# Patient Record
Sex: FEMALE | Race: WHITE | NOT HISPANIC OR LATINO | URBAN - METROPOLITAN AREA
[De-identification: names, ages, dates, MRNs, and addresses within clinical notes are randomized per-mention and may not be internally consistent; named-entity substitution may affect disease eponyms.]

---

## 2020-01-13 ENCOUNTER — OFFICE VISIT (OUTPATIENT)
Dept: URGENT CARE | Facility: CLINIC | Age: 30
End: 2020-01-13

## 2020-01-13 ENCOUNTER — APPOINTMENT (OUTPATIENT)
Dept: RADIOLOGY | Facility: IMAGING CENTER | Age: 30
End: 2020-01-13
Attending: PHYSICIAN ASSISTANT

## 2020-01-13 VITALS
HEIGHT: 68 IN | SYSTOLIC BLOOD PRESSURE: 100 MMHG | RESPIRATION RATE: 16 BRPM | WEIGHT: 113 LBS | DIASTOLIC BLOOD PRESSURE: 60 MMHG | HEART RATE: 103 BPM | BODY MASS INDEX: 17.13 KG/M2 | OXYGEN SATURATION: 98 % | TEMPERATURE: 99 F

## 2020-01-13 DIAGNOSIS — S67.01XA CRUSH INJURY TO THUMB, RIGHT, INITIAL ENCOUNTER: ICD-10-CM

## 2020-01-13 DIAGNOSIS — S67.01XA CRUSH INJURY TO THUMB, RIGHT, INITIAL ENCOUNTER: Primary | ICD-10-CM

## 2020-01-13 PROCEDURE — 73140 X-RAY EXAM OF FINGER(S): CPT | Mod: TC,RT | Performed by: PHYSICIAN ASSISTANT

## 2020-01-13 PROCEDURE — 99203 OFFICE O/P NEW LOW 30 MIN: CPT | Performed by: PHYSICIAN ASSISTANT

## 2020-01-13 NOTE — LETTER
January 13, 2020       Patient: Siobhan Chauhan   YOB: 1990   Date of Visit: 1/13/2020         To Whom It May Concern:    It is my medical opinion that Siobhan Chauhan is cleared for full duty at work after examination in clinic today. Pt will use caution with her duties not to worsen or aggravate right thumb symptoms.     If you have any questions or concerns, please don't hesitate to call [phone]          Sincerely,          Elpidio Berry P.A.-C.  Electronically Signed

## 2020-01-14 NOTE — PROGRESS NOTES
Subjective:      Pt is a 29 y.o. female who presents with Injury (thumb finger on right hand)            HPI  This is a new problem. Pt notes slammed right thumb in car door last night with crush injury and bruising and pain x 1 day. Pt notes she needs it evaluated and cleared for her work at [employer]. Pt has not taken any Rx medications for this condition. Pt states the pain is a 3/10, aching in nature and worse at night. Pt denies CP, SOB, NVD, paresthesias, headaches, dizziness, change in vision, hives, or other joint pain. The pt's medication list, problem list, and allergies have been evaluated and reviewed during today's visit.    PMH:  Negative per pt.      PSH:  Negative per pt.      Fam Hx:  the patient's family history is not pertinent to their current complaint      Soc HX:  Social History     Socioeconomic History   • Marital status: Single     Spouse name: Not on file   • Number of children: Not on file   • Years of education: Not on file   • Highest education level: Not on file   Occupational History   • Not on file   Social Needs   • Financial resource strain: Not on file   • Food insecurity:     Worry: Not on file     Inability: Not on file   • Transportation needs:     Medical: Not on file     Non-medical: Not on file   Tobacco Use   • Smoking status: Never Smoker   Substance and Sexual Activity   • Alcohol use: Yes   • Drug use: No   • Sexual activity: Not on file   Lifestyle   • Physical activity:     Days per week: Not on file     Minutes per session: Not on file   • Stress: Not on file   Relationships   • Social connections:     Talks on phone: Not on file     Gets together: Not on file     Attends Holiness service: Not on file     Active member of club or organization: Not on file     Attends meetings of clubs or organizations: Not on file     Relationship status: Not on file   • Intimate partner violence:     Fear of current or ex partner: Not on file     Emotionally abused: Not on file    "Physically abused: Not on file     Forced sexual activity: Not on file   Other Topics Concern   • Not on file   Social History Narrative   • Not on file         Medications:  No current outpatient medications on file.      Allergies:  Morphine    ROS    Constitutional: Negative for fever, chills and malaise/fatigue.   HENT: Negative for congestion and sore throat.    Eyes: Negative for blurred vision, double vision and photophobia.   Respiratory: Negative for cough and shortness of breath.  Cardiovascular: Negative for chest pain and palpitations.   Gastrointestinal: Negative for heartburn, nausea, vomiting, abdominal pain, diarrhea and constipation.   Genitourinary: Negative for dysuria and flank pain.   Musculoskeletal: POS for right thumb crush injury and myalgias.   Skin: Negative for itching and rash.   Neurological: Negative for dizziness, tingling and headaches.   Endo/Heme/Allergies: Does not bruise/bleed easily.   Psychiatric/Behavioral: Negative for depression. The patient is not nervous/anxious.         Objective:     /60 (BP Location: Left arm, Patient Position: Sitting, BP Cuff Size: Adult)   Pulse (!) 103   Temp 37.2 °C (99 °F) (Temporal)   Resp 16   Ht 1.727 m (5' 8\")   Wt 51.3 kg (113 lb)   LMP 12/15/2019   SpO2 98%   Breastfeeding? No   BMI 17.18 kg/m²      Physical Exam  Musculoskeletal:      Right hand: She exhibits decreased range of motion, tenderness, bony tenderness, disruption of two-point discrimination and swelling. She exhibits normal capillary refill, no deformity and no laceration. Normal sensation noted. Normal strength noted.        Hands:            Constitutional: PT is oriented to person, place, and time. PT appears well-developed and well-nourished. No distress.   HENT:   Head: Normocephalic and atraumatic.   Mouth/Throat: Oropharynx is clear and moist. No oropharyngeal exudate.   Eyes: Conjunctivae normal and EOM are normal. Pupils are equal, round, and reactive to " light.   Neck: Normal range of motion. Neck supple. No thyromegaly present.   Cardiovascular: Normal rate, regular rhythm, normal heart sounds and intact distal pulses.  Exam reveals no gallop and no friction rub.    No murmur heard.  Pulmonary/Chest: Effort normal and breath sounds normal. No respiratory distress. PT has no wheezes. PT has no rales. Pt exhibits no tenderness.   Abdominal: Soft. Bowel sounds are normal. PT exhibits no distension and no mass. There is no tenderness. There is no rebound and no guarding.   Neurological: PT is alert and oriented to person, place, and time. PT has normal reflexes. No cranial nerve deficit.   Skin: Skin is warm and dry. No rash noted. PT is not diaphoretic. No erythema.       Psychiatric: PT has a normal mood and affect. PT behavior is normal. Judgment and thought content normal.     RADS:    DX-FINGER(S) 2+ RIGHT   Order: [id number]   Status:  Final result   Visible to patient:  No (Not Released)   Next appt:  None   Dx:  Crush injury to thumb, right, initial...   Details     Reading Physician Reading Date Result Priority   London Cortes M.D. [facility]      Narrative & Impression        1/13/2020 4:23 PM     HISTORY/REASON FOR EXAM:  Pain/Deformity Following Trauma.  Pain after injury.     TECHNIQUE/EXAM DESCRIPTION AND NUMBER OF VIEWS:  3 views of the RIGHT fingers.     COMPARISON: None     FINDINGS:  There is no fracture or dislocation.  The visualized osseous structures are in anatomic alignment.  The joint spaces are grossly preserved.  Bone mineralization is age-appropriate..        IMPRESSION:     Normal.            Last Resulted: 01/13/20  4:38 PM                  Assessment/Plan:       1. Crush injury to thumb, right, initial encounter    - DX-FINGER(S) 2+ RIGHT; Future      RICE therapy discussed  Gentle ROM exercises discussed  WBAT right hand  Work note given  Ice/heat therapy discussed  OTC ibuprofen for pain control  Rest, fluids encouraged.  AVS  with medical info given.  Pt was in full understanding and agreement with the plan.  Follow-up as needed if symptoms worsen or fail to improve.